# Patient Record
Sex: FEMALE | Race: WHITE | URBAN - METROPOLITAN AREA
[De-identification: names, ages, dates, MRNs, and addresses within clinical notes are randomized per-mention and may not be internally consistent; named-entity substitution may affect disease eponyms.]

---

## 2019-02-08 ENCOUNTER — APPOINTMENT (RX ONLY)
Dept: URBAN - METROPOLITAN AREA OTHER 13 | Facility: OTHER | Age: 69
Setting detail: DERMATOLOGY
End: 2019-02-08

## 2019-02-08 DIAGNOSIS — L82.1 OTHER SEBORRHEIC KERATOSIS: ICD-10-CM

## 2019-02-08 DIAGNOSIS — L81.4 OTHER MELANIN HYPERPIGMENTATION: ICD-10-CM

## 2019-02-08 DIAGNOSIS — D18.0 HEMANGIOMA: ICD-10-CM

## 2019-02-08 PROBLEM — Z85.3 PERSONAL HISTORY OF MALIGNANT NEOPLASM OF BREAST: Status: ACTIVE | Noted: 2019-02-08

## 2019-02-08 PROBLEM — D18.01 HEMANGIOMA OF SKIN AND SUBCUTANEOUS TISSUE: Status: ACTIVE | Noted: 2019-02-08

## 2019-02-08 PROCEDURE — 99203 OFFICE O/P NEW LOW 30 MIN: CPT

## 2019-02-08 PROCEDURE — ? OBSERVATION

## 2019-02-08 PROCEDURE — ? COUNSELING

## 2019-02-08 ASSESSMENT — LOCATION DETAILED DESCRIPTION DERM
LOCATION DETAILED: RIGHT PROXIMAL PRETIBIAL REGION
LOCATION DETAILED: LEFT PROXIMAL PRETIBIAL REGION
LOCATION DETAILED: LEFT DISTAL DORSAL FOREARM
LOCATION DETAILED: LEFT MEDIAL UPPER BACK
LOCATION DETAILED: RIGHT RIB CAGE
LOCATION DETAILED: LEFT SUPERIOR OCCIPITAL SCALP
LOCATION DETAILED: LEFT RIB CAGE
LOCATION DETAILED: RIGHT CENTRAL ZYGOMA
LOCATION DETAILED: RIGHT DISTAL DORSAL FOREARM
LOCATION DETAILED: LEFT PROXIMAL RADIAL DORSAL FOREARM
LOCATION DETAILED: MIDDLE STERNUM
LOCATION DETAILED: STERNAL NOTCH
LOCATION DETAILED: SUPERIOR THORACIC SPINE

## 2019-02-08 ASSESSMENT — LOCATION ZONE DERM
LOCATION ZONE: FACE
LOCATION ZONE: SCALP
LOCATION ZONE: ARM
LOCATION ZONE: TRUNK
LOCATION ZONE: LEG

## 2019-02-08 ASSESSMENT — LOCATION SIMPLE DESCRIPTION DERM
LOCATION SIMPLE: LEFT FOREARM
LOCATION SIMPLE: RIGHT ZYGOMA
LOCATION SIMPLE: LEFT PRETIBIAL REGION
LOCATION SIMPLE: RIGHT PRETIBIAL REGION
LOCATION SIMPLE: LEFT UPPER BACK
LOCATION SIMPLE: UPPER BACK
LOCATION SIMPLE: ABDOMEN
LOCATION SIMPLE: LEFT OCCIPITAL SCALP
LOCATION SIMPLE: CHEST
LOCATION SIMPLE: RIGHT FOREARM

## 2024-02-20 ENCOUNTER — LAB (OUTPATIENT)
Dept: URBAN - METROPOLITAN AREA CLINIC 54 | Facility: CLINIC | Age: 74
End: 2024-02-20

## 2024-02-20 ENCOUNTER — OV NP (OUTPATIENT)
Dept: URBAN - METROPOLITAN AREA CLINIC 54 | Facility: CLINIC | Age: 74
End: 2024-02-20
Payer: COMMERCIAL

## 2024-02-20 VITALS
SYSTOLIC BLOOD PRESSURE: 154 MMHG | HEART RATE: 83 BPM | DIASTOLIC BLOOD PRESSURE: 73 MMHG | TEMPERATURE: 97.2 F | HEIGHT: 64 IN | WEIGHT: 160 LBS | BODY MASS INDEX: 27.31 KG/M2

## 2024-02-20 DIAGNOSIS — R13.19 OTHER DYSPHAGIA: ICD-10-CM

## 2024-02-20 PROCEDURE — 99243 OFF/OP CNSLTJ NEW/EST LOW 30: CPT | Performed by: STUDENT IN AN ORGANIZED HEALTH CARE EDUCATION/TRAINING PROGRAM

## 2024-02-20 PROCEDURE — 99203 OFFICE O/P NEW LOW 30 MIN: CPT | Performed by: STUDENT IN AN ORGANIZED HEALTH CARE EDUCATION/TRAINING PROGRAM

## 2024-02-20 RX ORDER — LISINOPRIL 2.5 MG/1
1 TABLET TABLET ORAL ONCE A DAY
Status: ACTIVE | COMMUNITY

## 2024-02-20 NOTE — HPI-TODAY'S VISIT:
--2/20/2024--Ms. Alyce St is a 73-year-old woman with history of breast cancer presents today for complaint of difficulty swallowing.  She is referred by her primary care physician Dr. Tiffanie Levy.  Patient complains of Dysphagia to meet and pills for many years.  Denies liquids.  Denies weight loss.  Denies radiation therapy.  Patient has had multiple anxiety around chest palpitations and also feelings that her throat is closing up.  Patient had rest test which she states was normal.

## 2024-02-23 ENCOUNTER — OV NP (OUTPATIENT)
Dept: URBAN - METROPOLITAN AREA CLINIC 54 | Facility: CLINIC | Age: 74
End: 2024-02-23

## 2024-02-28 ENCOUNTER — EGD (OUTPATIENT)
Dept: URBAN - METROPOLITAN AREA SURGERY CENTER 14 | Facility: SURGERY CENTER | Age: 74
End: 2024-02-28

## 2024-02-28 RX ORDER — PANTOPRAZOLE SODIUM 40 MG/1
1 TABLET TABLET, DELAYED RELEASE ORAL ONCE A DAY
Qty: 30 | Refills: 2 | OUTPATIENT
Start: 2024-02-28

## 2024-02-28 RX ORDER — LISINOPRIL 2.5 MG/1
1 TABLET TABLET ORAL ONCE A DAY
Status: ACTIVE | COMMUNITY

## 2024-03-12 ENCOUNTER — LAB (OUTPATIENT)
Dept: URBAN - METROPOLITAN AREA CLINIC 54 | Facility: CLINIC | Age: 74
End: 2024-03-12

## 2024-05-14 ENCOUNTER — LAB OUTSIDE AN ENCOUNTER (OUTPATIENT)
Dept: URBAN - METROPOLITAN AREA CLINIC 54 | Facility: CLINIC | Age: 74
End: 2024-05-14

## 2024-05-14 ENCOUNTER — TELEPHONE ENCOUNTER (OUTPATIENT)
Dept: URBAN - METROPOLITAN AREA CLINIC 54 | Facility: CLINIC | Age: 74
End: 2024-05-14

## 2025-04-15 ENCOUNTER — DASHBOARD ENCOUNTERS (OUTPATIENT)
Age: 75
End: 2025-04-15

## 2025-04-15 ENCOUNTER — OFFICE VISIT (OUTPATIENT)
Dept: URBAN - METROPOLITAN AREA CLINIC 54 | Facility: CLINIC | Age: 75
End: 2025-04-15
Payer: MEDICARE

## 2025-04-15 DIAGNOSIS — R13.19 OTHER DYSPHAGIA: ICD-10-CM

## 2025-04-15 DIAGNOSIS — R19.7 DIARRHEA, UNSPECIFIED TYPE: ICD-10-CM

## 2025-04-15 PROCEDURE — 99214 OFFICE O/P EST MOD 30 MIN: CPT | Performed by: PHYSICIAN ASSISTANT

## 2025-04-15 RX ORDER — DILTIAZEM HYDROCHLORIDE 60 MG/1
AS DIRECTED TABLET, FILM COATED ORAL
Status: ON HOLD | COMMUNITY

## 2025-04-15 RX ORDER — LISINOPRIL 2.5 MG/1
1 TABLET TABLET ORAL ONCE A DAY
Status: ACTIVE | COMMUNITY

## 2025-04-15 RX ORDER — DIMENHYDRINATE 50 MG
AS DIRECTED TABLET ORAL
Status: ACTIVE | COMMUNITY

## 2025-04-15 RX ORDER — PANTOPRAZOLE SODIUM 40 MG/1
1 TABLET TABLET, DELAYED RELEASE ORAL ONCE A DAY
Qty: 30 | Refills: 2 | Status: ON HOLD | COMMUNITY
Start: 2024-02-28

## 2025-04-15 RX ORDER — COLESTIPOL HYDROCHLORIDE 1 G/1
2 TABLETS TABLET, FILM COATED ORAL ONCE A DAY
Qty: 60 | Refills: 1 | OUTPATIENT
Start: 2025-04-15

## 2025-04-15 NOTE — HPI-TODAY'S VISIT:
--2/20/2024--Ms. Alyce St is a 73-year-old woman with history of breast cancer presents today for complaint of difficulty swallowing.  She is referred by her primary care physician Dr. Tiffanie Levy.  Patient complains of Dysphagia to meet and pills for many years.  Denies liquids.  Denies weight loss.  Denies radiation therapy.  Patient has had multiple anxiety around chest palpitations and also feelings that her throat is closing up.  Patient had rest test which she states was normal.  4/15/25: Patient with a remote hx of breast cancer in 2006 presents for loose to watery stool x 3 months. No abdominal pain w/o overt signs of bleeding. Typcially with 3-6 BMs daily. Yogurt and chocolate worsen baseline symptoms. Negative C diff, O and P, and stool culture. Denies new meds, recent travel, and sick contacts. Patient was previously on regular fiber supplementation, but discontinued approx 1.5 months ago. On several supplements. No NSAID use. Of note, patient had an EGD in February 2024 with Dr. Rider for dysphagia that showed widely patent Schatzki's ring otherwise unremarkable.  No dilation performed.  Benign esophageal biopsies.  MBS performed that showed oropharyngeal swallowing is WNL.

## 2025-05-09 ENCOUNTER — ERX REFILL RESPONSE (OUTPATIENT)
Dept: URBAN - NONMETROPOLITAN AREA CLINIC 4 | Facility: CLINIC | Age: 75
End: 2025-05-09

## 2025-05-09 RX ORDER — COLESTIPOL HYDROCHLORIDE 1 G/1
2 TABLETS TABLET, FILM COATED ORAL ONCE A DAY
Qty: 60 | Refills: 1 | OUTPATIENT

## 2025-05-09 RX ORDER — COLESTIPOL HYDROCHLORIDE 1 G/1
TAKE 2 TABLETS BY MOUTH EVERY DAY TABLET, FILM COATED ORAL
Qty: 180 TABLET | Refills: 1 | OUTPATIENT

## 2025-06-18 ENCOUNTER — OFFICE VISIT (OUTPATIENT)
Dept: URBAN - METROPOLITAN AREA CLINIC 54 | Facility: CLINIC | Age: 75
End: 2025-06-18